# Patient Record
Sex: FEMALE
[De-identification: names, ages, dates, MRNs, and addresses within clinical notes are randomized per-mention and may not be internally consistent; named-entity substitution may affect disease eponyms.]

---

## 2020-04-05 ENCOUNTER — NURSE TRIAGE (OUTPATIENT)
Dept: OTHER | Facility: CLINIC | Age: 25
End: 2020-04-05

## 2020-04-05 NOTE — TELEPHONE ENCOUNTER
Reason for Disposition   MILD difficulty breathing (e.g., minimal/no SOB at rest, SOB with walking, pulse <100)    Answer Assessment - Initial Assessment Questions  1. COVID-19 DIAGNOSIS: \"Who made your Coronavirus (COVID-19) diagnosis? \" \"Was it confirmed by a positive lab test?\" If not diagnosed by a HCP, ask \"Are there lots of cases (community spread) where you live? \" (See [facility] health department website, if unsure)    * MAJOR community spread: high number of cases; numbers of cases are increasing; many people hospitalized. * MINOR community spread: low number of cases; not increasing; few or no people hospitalized      Not diagnosed. \"There are some confirmed cases but not as bad as it is in [address]. \"  2. ONSET: \"When did the COVID-19 symptoms start? \"       Cough around Wednesday. 3. WORST SYMPTOM: \"What is your worst symptom? \" (e.g., cough, fever, shortness of breath, muscle aches)      \"Not getting full breathes. \" per patient  4. COUGH: \"How bad is the cough? \"        \"It's pretty dry, not hacking. It's just if I'm sitting and talking. It's persistant. \"  5. FEVER: \"Do you have a fever? \" If so, ask: \"What is your temperature, how was it measured, and when did it start? \"      Patent said no fever but sweater during the night. 6. RESPIRATORY STATUS: \"Describe your breathing? \" (e.g., shortness of breath, wheezing, unable to speak)       I feel like if I really try to draw in a breath, but just breathing normal I feel like it;s just short, shallow breathes. I've never had issues with breathing. \" per patient  7. BETTER-SAME-WORSE: Clifton Apple you getting better, staying the same or getting worse compared to yesterday? \"  If getting worse, ask, \"In what way? \"      \"Today is worse. I feel more fatigued. \"  8. HIGH RISK DISEASE: \"Do you have any chronic medical problems? \" (e.g., asthma, heart or lung disease, weak immune system, etc.)      no  9. PREGNANCY: \"Is there any chance you are pregnant? \" \"When was your last menstrual period? \"      no  10. OTHER SYMPTOMS: \"Do you have any other symptoms? \"  (e.g., runny nose, headache, sore throat, loss of smell)        Cough, loss of smell, weakness, muscle pain, sob, joint pain. Protocols used: CORONAVIRUS (COVID-19) DIAGNOSED OR SUSPECTED-ADULT-AH    Advised to contact pcp or set up e-visit. Explained self isolating and cover mouth when couging or sneezing. Instructed to call back if symptoms worse.